# Patient Record
Sex: FEMALE | Race: WHITE | NOT HISPANIC OR LATINO | Employment: FULL TIME | URBAN - METROPOLITAN AREA
[De-identification: names, ages, dates, MRNs, and addresses within clinical notes are randomized per-mention and may not be internally consistent; named-entity substitution may affect disease eponyms.]

---

## 2022-01-17 ENCOUNTER — HOSPITAL ENCOUNTER (EMERGENCY)
Facility: CLINIC | Age: 37
Discharge: HOME | End: 2022-01-17
Attending: EMERGENCY MEDICINE | Admitting: EMERGENCY MEDICINE
Payer: COMMERCIAL

## 2022-01-17 VITALS
RESPIRATION RATE: 118 BRPM | TEMPERATURE: 98.1 F | WEIGHT: 293 LBS | OXYGEN SATURATION: 98 % | HEIGHT: 64 IN | HEART RATE: 106 BPM | DIASTOLIC BLOOD PRESSURE: 107 MMHG | BODY MASS INDEX: 50.02 KG/M2 | SYSTOLIC BLOOD PRESSURE: 148 MMHG

## 2022-01-17 DIAGNOSIS — G89.29 CHRONIC PAIN OF LEFT LOWER EXTREMITY: ICD-10-CM

## 2022-01-17 DIAGNOSIS — F10.920 ALCOHOL INTOXICATION, UNCOMPLICATED (H): ICD-10-CM

## 2022-01-17 DIAGNOSIS — M79.605 CHRONIC PAIN OF LEFT LOWER EXTREMITY: ICD-10-CM

## 2022-01-17 PROCEDURE — 250N000011 HC RX IP 250 OP 636: Performed by: EMERGENCY MEDICINE

## 2022-01-17 PROCEDURE — 96372 THER/PROPH/DIAG INJ SC/IM: CPT | Performed by: EMERGENCY MEDICINE

## 2022-01-17 PROCEDURE — 99284 EMERGENCY DEPT VISIT MOD MDM: CPT

## 2022-01-17 RX ORDER — KETOROLAC TROMETHAMINE 30 MG/ML
30 INJECTION, SOLUTION INTRAMUSCULAR; INTRAVENOUS ONCE
Status: COMPLETED | OUTPATIENT
Start: 2022-01-17 | End: 2022-01-17

## 2022-01-17 RX ORDER — GABAPENTIN 300 MG/1
300 CAPSULE ORAL
Status: DISCONTINUED | OUTPATIENT
Start: 2022-01-17 | End: 2022-01-17 | Stop reason: HOSPADM

## 2022-01-17 RX ORDER — GABAPENTIN 300 MG/1
300 CAPSULE ORAL ONCE
Status: DISCONTINUED | OUTPATIENT
Start: 2022-01-17 | End: 2022-01-17

## 2022-01-17 RX ADMIN — KETOROLAC TROMETHAMINE 30 MG: 30 INJECTION, SOLUTION INTRAMUSCULAR at 01:52

## 2022-01-17 ASSESSMENT — MIFFLIN-ST. JEOR: SCORE: 2126.51

## 2022-01-17 NOTE — ED TRIAGE NOTES
Pt was with her ex boyfriend tonight who got pulled over for a DUI, Pt got out of the vehicle slipped and felt that she was to intoxicated to go home and take care of herself.

## 2022-01-17 NOTE — DISCHARGE INSTRUCTIONS
Abstain from alcohol  Follow-up with your primary care doctor within the next week for recheck  Return to the emergency department for worsening problems or concerns

## 2022-01-17 NOTE — ED PROVIDER NOTES
EMERGENCY DEPARTMENT ENCOUnter      NAME: Marianela Nickerson  AGE: 36 year old female  YOB: 1985  MRN: [id number]  EVALUATION DATE & TIME: 1/17/2022 12:29 AM    PCP: No primary care provider on file.    ED PROVIDER: Mitra Coe MD      Chief Complaint   Patient presents with     Alcohol Intoxication         FINAL IMPRESSION:  1. Alcohol intoxication, uncomplicated (H)    2. Chronic pain of left lower extremity          ED COURSE & MEDICAL DECISION MAKING:      In summary, the patient is a 36-year-old female brought to the emergency department by EMS for evaluation of alcohol intoxication and concern that she may not be able to care for herself since her  was arrested for DWI.  Patient's mother is able to stay with her through the night and she has a service dog.  She would like to go home without any further evaluation which I think is reasonable.  Patient declines any testing in the emergency department  12:26AM I met with the patient for the initial interview and physical examination. Discussed plan for treatment and workup in the ED. PPE: Provider wore surgical cap, goggles, and N95 mask.   1:12 AM Patient's mother called. I spoke with her.  Toradol 30 mg IM was administered for her left foot pain  1:14 AM I updated the patient. Patient always makes comments about suicidal ideation due to chronic pain and has a therapist that she talks to. She contracts for safety and will not harm herself tonight, and will return to the emergency department if she is feeling unsafe    At the conclusion of the encounter I discussed the results of all of the tests and the disposition. The questions were answered. The patient or family acknowledged understanding and was agreeable with the care plan.         MEDICATIONS GIVEN IN THE EMERGENCY:  Medications   ketorolac (TORADOL) injection 30 mg (30 mg Intramuscular Given 1/17/22 0152)       NEW PRESCRIPTIONS STARTED AT TODAY'S ER VISIT  There are no  discharge medications for this patient.         =================================================================    HPI    Marianela Nickerson is a 36 year old female with a pertinent history of chronic left foot pain s/p multiple surgeries who presents to this ED via EMS for evaluation of intoxication.     Patient is unable to walk secondary to chronic foot pain and her  is her caretaker. Tonight, her  was arrested for DUI, they went to the house for a welfare check, and did not feel that patient could care for her self because she was intoxicated and unable to walk, so they brought her to the ED. Patient has no current complaints aside from chronic left foot pain. States that she had 3 drinks tonight. Has a history of anxiety. Fully vaccinated for Covid-19 + booster.       REVIEW OF SYSTEMS     Constitutional:  Denies fever or chills  HENT:  Denies sore throat   Respiratory:  Denies cough or shortness of breath   Cardiovascular:  Denies chest pain or palpitations  GI:  Denies abdominal pain, nausea, or vomiting  Musculoskeletal:  Denies any new extremity pain. Reports chronic left foot pain.  Skin:  Denies rash   Neurologic:  Denies headache, focal weakness or sensory changes    All other systems reviewed and are negative      PAST MEDICAL HISTORY:  History reviewed. No pertinent past medical history.    PAST SURGICAL HISTORY:  History reviewed. No pertinent surgical history.        CURRENT MEDICATIONS:    No current outpatient medications on file.      ALLERGIES:  No Known Allergies    FAMILY HISTORY:  History reviewed. No pertinent family history.    SOCIAL HISTORY:   Social History     Socioeconomic History     Marital status: Single     Spouse name: None     Number of children: None     Years of education: None     Highest education level: None   Occupational History     None   Tobacco Use     Smoking status: None     Smokeless tobacco: None   Substance and Sexual Activity     Alcohol use: Yes      Drug use: Yes     Types: Marijuana     Sexual activity: None   Other Topics Concern     None   Social History Narrative    1/17/22: Lives at home with her , who is her caretaker. Patient has trouble walking due to chronic foot pain, has a service dog at home. Smokes marijuana to help with the pain.      Social Determinants of Health     Financial Resource Strain: Not on file   Food Insecurity: Not on file   Transportation Needs: Not on file   Physical Activity: Not on file   Stress: Not on file   Social Connections: Not on file   Intimate Partner Violence: Not on file   Housing Stability: Not on file       PHYSICAL EXAM    Constitutional: Obese  HENT:  Normocephalic, Atraumatic, Bilateral external ears normal, Oropharynx moist, Nose normal.   Neck:  Normal range of motion, No meningismus, No stridor.   Eyes:  EOMI, Conjunctiva normal, No discharge.   Respiratory:  Normal breath sounds, No respiratory distress, No wheezing, No chest tenderness.   Cardiovascular:  Normal heart rate, Normal rhythm, No murmurs  GI:  Soft, No tenderness, No guarding, No CVA tenderness.   Musculoskeletal:  Neurovascularly intact distally, No edema, No tenderness, No cyanosis, Good range of motion in all major joints. No tenderness to palpation or major deformities noted.   Integument:  Warm, Dry, No erythema, No rash.   Lymphatic:  No lymphadenopathy noted.   Neurologic:  Alert & oriented x 3, Normal motor function, Normal sensory function, No focal deficits noted.   Psychiatric:  Affect normal, Judgment normal, Mood normal.                I, Nelly Patel, am serving as a scribe to document services personally performed by Dr. Coe based on my observation and the provider's statements to me. I, Mitra Coe MD attest that Nelly Patel is acting in a scribe capacity, has observed my performance of the services and has documented them in accordance with my direction.    Mitra Coe MD  Emergency  [facility] EMERGENCY ROOM  [address] [facility] [address]  [phone]  Dept: [phone]     Mitra Coe MD  01/18/22 2222

## 2022-05-26 ENCOUNTER — TRANSFERRED RECORDS (OUTPATIENT)
Dept: HEALTH INFORMATION MANAGEMENT | Facility: CLINIC | Age: 37
End: 2022-05-26

## 2022-05-28 ENCOUNTER — APPOINTMENT (OUTPATIENT)
Dept: RADIOLOGY | Facility: CLINIC | Age: 37
End: 2022-05-28
Attending: EMERGENCY MEDICINE
Payer: COMMERCIAL

## 2022-05-28 ENCOUNTER — HOSPITAL ENCOUNTER (EMERGENCY)
Facility: CLINIC | Age: 37
Discharge: HOME | End: 2022-05-28
Attending: EMERGENCY MEDICINE | Admitting: EMERGENCY MEDICINE
Payer: COMMERCIAL

## 2022-05-28 VITALS
HEART RATE: 109 BPM | RESPIRATION RATE: 18 BRPM | BODY MASS INDEX: 47.48 KG/M2 | WEIGHT: 285 LBS | HEIGHT: 65 IN | SYSTOLIC BLOOD PRESSURE: 143 MMHG | OXYGEN SATURATION: 97 % | DIASTOLIC BLOOD PRESSURE: 91 MMHG | TEMPERATURE: 98.5 F

## 2022-05-28 DIAGNOSIS — S90.30XA CONTUSION OF DORSUM OF FOOT: ICD-10-CM

## 2022-05-28 PROCEDURE — 250N000011 HC RX IP 250 OP 636: Performed by: EMERGENCY MEDICINE

## 2022-05-28 PROCEDURE — 96372 THER/PROPH/DIAG INJ SC/IM: CPT | Performed by: EMERGENCY MEDICINE

## 2022-05-28 PROCEDURE — 99285 EMERGENCY DEPT VISIT HI MDM: CPT | Mod: 25

## 2022-05-28 PROCEDURE — 73630 X-RAY EXAM OF FOOT: CPT | Mod: LT

## 2022-05-28 RX ORDER — HYDROMORPHONE HYDROCHLORIDE 1 MG/ML
0.5 INJECTION, SOLUTION INTRAMUSCULAR; INTRAVENOUS; SUBCUTANEOUS ONCE
Status: COMPLETED | OUTPATIENT
Start: 2022-05-28 | End: 2022-05-28

## 2022-05-28 RX ORDER — TRAMADOL HYDROCHLORIDE 50 MG/1
100 TABLET ORAL ONCE
Status: DISCONTINUED | OUTPATIENT
Start: 2022-05-28 | End: 2022-05-28 | Stop reason: HOSPADM

## 2022-05-28 RX ORDER — TRAMADOL HYDROCHLORIDE 50 MG/1
50 TABLET ORAL EVERY 6 HOURS PRN
Qty: 10 TABLET | Refills: 0 | Status: SHIPPED | OUTPATIENT
Start: 2022-05-28 | End: 2022-05-31

## 2022-05-28 RX ORDER — KETOROLAC TROMETHAMINE 15 MG/ML
30 INJECTION, SOLUTION INTRAMUSCULAR; INTRAVENOUS ONCE
Status: COMPLETED | OUTPATIENT
Start: 2022-05-28 | End: 2022-05-28

## 2022-05-28 RX ADMIN — HYDROMORPHONE HYDROCHLORIDE 0.5 MG: 1 INJECTION, SOLUTION INTRAMUSCULAR; INTRAVENOUS; SUBCUTANEOUS at 03:01

## 2022-05-28 RX ADMIN — KETOROLAC TROMETHAMINE 30 MG: 15 INJECTION, SOLUTION INTRAMUSCULAR; INTRAVENOUS at 02:09

## 2022-05-28 NOTE — Clinical Note
Marianela Nickerson was seen and treated in our emergency department on 5/28/2022.  She may return to work on 06/04/2022.       If you have any questions or concerns, please don't hesitate to call.      Mp Samuel MD

## 2022-05-28 NOTE — ED TRIAGE NOTES
1 hr PTA pt states that she was out with friends and someone stepped on her foot. Swelling present. Pain 10/10. Pt states she has had many foot operations on same foot that is injured.      Triage Assessment     Row Name 05/28/22 0043       Triage Assessment (Adult)    Airway WDL WDL       Respiratory WDL    Respiratory WDL WDL       Skin Circulation/Temperature WDL    Skin Circulation/Temperature WDL WDL       Cardiac WDL    Cardiac WDL WDL       Peripheral/Neurovascular WDL    Peripheral Neurovascular WDL WDL       Cognitive/Neuro/Behavioral WDL    Cognitive/Neuro/Behavioral WDL WDL

## 2022-05-28 NOTE — ED PROVIDER NOTES
NAME: Marianela Nickerson  AGE: 36 year old female  YOB: 1985  MRN: [id number]  EVALUATION DATE & TIME: 2022  1:44 AM    PCP: Yolanda Resendiz    ED PROVIDER: Mp Samuel M.D.      Chief Complaint   Patient presents with     Foot Pain         FINAL IMPRESSION:  1. Contusion of dorsum of foot        MEDICAL DECISION MAKIN:48 AM I met with the patient, obtained history, performed an initial exam, and discussed options and plan for diagnostics and treatment here in the ED.   2:43 AM Reassessed patient. Per boyfriend, patient does not typically take pain medications due to them needing to be approved. In the previous surgeries patient was given tramadol. Boyfriend reports patient is usually calm, but with drinking and waiting tonight patient is agitated.   3:02 AM Updated patient on her imaging result. I discussed the plan for discharge with the patient, and patient is agreeable. We discussed supportive cares at home and reasons for return to the ER including new or worsening symptoms - all questions and concerns addressed. Patient to be discharged by RN.      Patient was clinically assessed and consented to treatment. After assessment, medical decision making and workup were discussed with the patient. The patient was agreeable to plan for testing, workup, and treatment.  Pertinent Labs & Imaging studies reviewed. (See chart for details)         Marianela Nickerson is a 36 year old female who presents with left foot injury.   Differential diagnosis includes but not limited to foot contusion, metatarsal fracture, navicular fracture, dislocation.  Patient with foot injury and does have chronic pain in left foot and multiple surgeries in the past.  Patient reports initially only taking Toradol for pain as she cannot take NSAIDs or oral medication due to absorption issues and stomach irritation following gastric bypass.  Shot of Toradol was given patient was sent for x-ray.  Patient did  "complain that Toradol did not help with her pain however awaiting x-ray results.  X-ray performed and did not show any acute bony abnormalities.  Patient likely with just contusions to the foot however given her chronic pain is exacerbated.  Patient does report now that she has taken Toradol in the past after surgeries but does not take any other narcotics.  I did give her 2 tablets of tramadol and will give her prescription for home as she requested something stronger given her inability to take anything very strong orally.  Patient will be put on crutches and nonweightbearing with recommendations to ice, elevate and rest the foot with nonweightbearing and then she was to apply compression.  At this time given the sensitivity and tenderness I recommended ice and elevation prior to compression since she was so acutely tender over the bruised area.    0 minutes of critical care time    MEDICATIONS GIVEN IN THE EMERGENCY:  Medications   ketorolac (TORADOL) injection 30 mg (30 mg Intramuscular Given 5/28/22 0209)   HYDROmorphone (PF) (DILAUDID) injection 0.5 mg (0.5 mg Intramuscular Given 5/28/22 0301)       NEW PRESCRIPTIONS STARTED AT TODAY'S ER VISIT:  There are no discharge medications for this patient.         =================================================================    HPI    Patient information was obtained from: Patient     Use of : N/A      Marianela Nickerson is a 36 year old female with a past medical history of RSD of left ankle, s/p gastrectomy, who presents with left foot pain. Patient reports she was stepped on her left foot by someone's high heel while out with friends. She notes she was wearing flats when this occurred. Now area is very tender to palpation. Patient reports history of CRPS and extensive left foot surgeries which includes having 36 pins and 27 plates. She reports allergies to oral medications to due having her \"esophagus and stomach removed\". Patient requests for tramadol " "as that has helpd her pain in the past. Patient denies any additional complaints at this time.       REVIEW OF SYSTEMS   Review of Systems   Musculoskeletal:        Positive for left foot pain and swelling.   All other systems reviewed and are negative.       PAST MEDICAL HISTORY:  No past medical history on file.    PAST SURGICAL HISTORY:  No past surgical history on file.    CURRENT MEDICATIONS:    No current facility-administered medications for this encounter.    Current Outpatient Medications:      traMADol (ULTRAM) 50 MG tablet, Take 1 tablet (50 mg) by mouth every 6 hours as needed for breakthrough pain or severe pain, Disp: 10 tablet, Rfl: 0    ALLERGIES:  Allergies   Allergen Reactions     Penicillins Hives, Unknown and Nausea and Vomiting     Ketamine Other (See Comments) and Unknown     Hallucinations       Nsaids Other (See Comments) and Unknown     Avoids due to surgery for achalasia...    gastrectomy       FAMILY HISTORY:  No family history on file.    SOCIAL HISTORY:   Social History     Socioeconomic History     Marital status: Single   Substance and Sexual Activity     Alcohol use: Yes     Drug use: Yes     Types: Marijuana   Social History Narrative    1/17/22: Lives at home with her , who is her caretaker. Patient has trouble walking due to chronic foot pain, has a service dog at home. Smokes marijuana to help with the pain.        PHYSICAL EXAM:    Vitals: BP (!) 143/91 (BP Location: Left arm, Patient Position: Chair, Cuff Size: Adult Regular)   Pulse 109   Temp 98.5  F (36.9  C) (Oral)   Resp 18   Ht 1.651 m (5' 5\")   Wt 129.3 kg (285 lb)   SpO2 97%   BMI 47.43 kg/m     Physical Exam  Vitals and nursing note reviewed.   Constitutional:       General: She is not in acute distress.     Appearance: Normal appearance. She is normal weight. She is not ill-appearing or toxic-appearing.   HENT:      Head: Atraumatic.   Cardiovascular:      Pulses: Normal pulses.   Pulmonary:      Effort: " No respiratory distress.   Musculoskeletal:         General: Swelling, tenderness and signs of injury present. No deformity.      Cervical back: Normal range of motion.        Feet:    Skin:     General: Skin is warm and dry.      Capillary Refill: Capillary refill takes less than 2 seconds.      Coloration: Skin is not pale.   Neurological:      General: No focal deficit present.      Mental Status: She is alert.   Psychiatric:         Mood and Affect: Affect is angry.         Behavior: Behavior is cooperative.             RADIOLOGY:  XR Foot Left 3 Views   Final Result   IMPRESSION: No acute fracture or dislocation.            I, Sawyer Tyler, am serving as a scribe to document services personally performed by Dr. Mp Samuel  based on my observation and the provider's statements to me. I, Mp Samuel MD attest that Sawyer Tyler is acting in a scribe capacity, has observed my performance of the services and has documented them in accordance with my direction.      Mp Samuel M.D.  Emergency Medicine  [facility] Emergency Department     Mp Samuel MD  05/28/22 8039

## 2022-05-28 NOTE — ED NOTES
"Pt requesting to take at home anxiety medication. MD updated and OK'd pt to take. Updated pt and pt stated, \"I do not have it I guess, it is at home\". Pt did not take any medication at this time. ...Mackenzie Murdock RN    "

## 2022-08-15 ENCOUNTER — APPOINTMENT (OUTPATIENT)
Dept: CT IMAGING | Facility: CLINIC | Age: 37
End: 2022-08-15
Attending: EMERGENCY MEDICINE
Payer: COMMERCIAL

## 2022-08-15 ENCOUNTER — HOSPITAL ENCOUNTER (EMERGENCY)
Facility: CLINIC | Age: 37
Discharge: HOME | End: 2022-08-15
Attending: EMERGENCY MEDICINE | Admitting: EMERGENCY MEDICINE
Payer: COMMERCIAL

## 2022-08-15 VITALS
HEART RATE: 66 BPM | HEIGHT: 65 IN | WEIGHT: 280 LBS | BODY MASS INDEX: 46.65 KG/M2 | OXYGEN SATURATION: 99 % | DIASTOLIC BLOOD PRESSURE: 79 MMHG | RESPIRATION RATE: 16 BRPM | SYSTOLIC BLOOD PRESSURE: 153 MMHG | TEMPERATURE: 98.3 F

## 2022-08-15 DIAGNOSIS — R51.9 ACUTE NONINTRACTABLE HEADACHE, UNSPECIFIED HEADACHE TYPE: ICD-10-CM

## 2022-08-15 LAB
HOLD SPECIMEN: NORMAL

## 2022-08-15 PROCEDURE — 250N000013 HC RX MED GY IP 250 OP 250 PS 637: Performed by: EMERGENCY MEDICINE

## 2022-08-15 PROCEDURE — 96374 THER/PROPH/DIAG INJ IV PUSH: CPT

## 2022-08-15 PROCEDURE — 96375 TX/PRO/DX INJ NEW DRUG ADDON: CPT

## 2022-08-15 PROCEDURE — 99285 EMERGENCY DEPT VISIT HI MDM: CPT | Mod: 25

## 2022-08-15 PROCEDURE — 96372 THER/PROPH/DIAG INJ SC/IM: CPT | Performed by: EMERGENCY MEDICINE

## 2022-08-15 PROCEDURE — 96361 HYDRATE IV INFUSION ADD-ON: CPT

## 2022-08-15 PROCEDURE — 258N000003 HC RX IP 258 OP 636: Performed by: EMERGENCY MEDICINE

## 2022-08-15 PROCEDURE — 250N000011 HC RX IP 250 OP 636: Performed by: EMERGENCY MEDICINE

## 2022-08-15 PROCEDURE — 70450 CT HEAD/BRAIN W/O DYE: CPT

## 2022-08-15 RX ORDER — DIPHENHYDRAMINE HYDROCHLORIDE 50 MG/ML
50 INJECTION INTRAMUSCULAR; INTRAVENOUS ONCE
Status: COMPLETED | OUTPATIENT
Start: 2022-08-15 | End: 2022-08-15

## 2022-08-15 RX ORDER — KETOROLAC TROMETHAMINE 30 MG/ML
30 INJECTION, SOLUTION INTRAMUSCULAR; INTRAVENOUS ONCE
Status: COMPLETED | OUTPATIENT
Start: 2022-08-15 | End: 2022-08-15

## 2022-08-15 RX ORDER — OXYCODONE AND ACETAMINOPHEN 5; 325 MG/1; MG/1
1 TABLET ORAL ONCE
Status: COMPLETED | OUTPATIENT
Start: 2022-08-15 | End: 2022-08-15

## 2022-08-15 RX ORDER — KETOROLAC TROMETHAMINE 15 MG/ML
15 INJECTION, SOLUTION INTRAMUSCULAR; INTRAVENOUS ONCE
Status: DISCONTINUED | OUTPATIENT
Start: 2022-08-15 | End: 2022-08-15

## 2022-08-15 RX ORDER — CYCLOBENZAPRINE HCL 10 MG
10 TABLET ORAL ONCE
Status: COMPLETED | OUTPATIENT
Start: 2022-08-15 | End: 2022-08-15

## 2022-08-15 RX ORDER — DIPHENHYDRAMINE HYDROCHLORIDE 50 MG/ML
50 INJECTION INTRAMUSCULAR; INTRAVENOUS ONCE
Status: DISCONTINUED | OUTPATIENT
Start: 2022-08-15 | End: 2022-08-15

## 2022-08-15 RX ADMIN — SODIUM CHLORIDE 1000 ML: 9 INJECTION, SOLUTION INTRAVENOUS at 07:47

## 2022-08-15 RX ADMIN — OXYCODONE AND ACETAMINOPHEN 1 TABLET: 5; 325 TABLET ORAL at 06:45

## 2022-08-15 RX ADMIN — KETOROLAC TROMETHAMINE 30 MG: 30 INJECTION, SOLUTION INTRAMUSCULAR; INTRAVENOUS at 06:46

## 2022-08-15 RX ADMIN — CYCLOBENZAPRINE 10 MG: 10 TABLET, FILM COATED ORAL at 06:36

## 2022-08-15 RX ADMIN — DIPHENHYDRAMINE HYDROCHLORIDE 50 MG: 50 INJECTION, SOLUTION INTRAMUSCULAR; INTRAVENOUS at 07:44

## 2022-08-15 RX ADMIN — PROCHLORPERAZINE EDISYLATE 5 MG: 5 INJECTION INTRAMUSCULAR; INTRAVENOUS at 07:44

## 2022-08-15 ASSESSMENT — ACTIVITIES OF DAILY LIVING (ADL)
ADLS_ACUITY_SCORE: 35
ADLS_ACUITY_SCORE: 35

## 2022-08-15 NOTE — Clinical Note
Marianlea Nickerson was seen and treated in our emergency department on 8/15/2022.  She may return to work on 08/16/2022.       If you have any questions or concerns, please don't hesitate to call.      Neftali Jefferson MD

## 2022-08-15 NOTE — DISCHARGE INSTRUCTIONS
You were seen in the emergency department at [facility] for headache.  Your evaluation included a head CT which looked stable and reassuring.  You were treated with multiple IV medications which we think will continue to help improve your headache symptoms throughout the day.  Please make sure you are drinking plenty of liquids to stay well-hydrated.  You can continue using Imitrex and Tylenol 650 mg every 6 hours for pain.  You can also take Benadryl at nighttime to help with sleep and headache.  We would like you to follow-up in clinic to review any ongoing concerns before the end of the week if your headaches do not completely resolved.

## 2022-08-15 NOTE — ED PROVIDER NOTES
EMERGENCY DEPARTMENT ENCOUNTER      NAME: Marianela Nickerson  AGE: 36 year old female  YOB: 1985  MRN: [id number]  EVALUATION DATE & TIME: 8/15/2022  5:47 AM    PCP: Yolanda Resendiz    ED PROVIDER: Neftali Jefferson M.D.      Chief Complaint   Patient presents with     Headache       FINAL IMPRESSION:  1. Acute nonintractable headache, unspecified headache type        ED COURSE & MEDICAL DECISION MAKIN:12 AM I met with the patient, obtained history, performed an initial exam, and discussed options and plan for diagnostics and treatment here in the ED.  7:13 AM My medical student checked on the patient.   8:44 AM My medical student updated the patient on result.   8:52 AM I rechecked on the patient. We discussed the plan for discharge and the patient is agreeable. Reviewed supportive cares, symptomatic treatment, outpatient follow up, and reasons to return to the Emergency Department. Patient to be discharged by ED RN.     36 year old female presents to the Emergency Department for evaluation of headache.  Patient having intermittent sudden onset headaches for the last 3 days, today was more severe.  Patient is vitally stable and neurologically intact when she arrives to the emergency department.  She appears quite anxious and uncomfortable however.  She ultimately received multiple agents to control her headache eventually having obtain IV access and administering Compazine and Benadryl seem to provide her with the most relief.  Given multiple agents needed, did elect to obtain noncontrast head CT which was negative for anything like subarachnoid hemorrhage or mass lesion.  Not suspect anything else like arterial dissection or sinus thrombosis requiring additional imaging at this time.  Patient was ultimately resting more comfortably on reevaluation.  She is agreeable to continued supportive measures at home and primary care follow-up.  Return precautions were reviewed.    At the conclusion of  the encounter I discussed the results of all of the tests and the disposition. The questions were answered. The patient or family acknowledged understanding and was agreeable with the care plan.     MEDICATIONS GIVEN IN THE EMERGENCY:  Medications   cyclobenzaprine (FLEXERIL) tablet 10 mg (10 mg Oral Given 8/15/22 0636)   ketorolac (TORADOL) injection 30 mg (30 mg Intramuscular Given 8/15/22 0646)   oxyCODONE-acetaminophen (PERCOCET) 5-325 MG per tablet 1 tablet (1 tablet Oral Given 8/15/22 0645)   0.9% sodium chloride BOLUS (0 mLs Intravenous Stopped 8/15/22 0918)   diphenhydrAMINE (BENADRYL) injection 50 mg (50 mg Intravenous Given 8/15/22 0744)   prochlorperazine (COMPAZINE) injection 5 mg (5 mg Intravenous Given 8/15/22 0744)       NEW PRESCRIPTIONS STARTED AT TODAY'S ER VISIT  There are no discharge medications for this patient.         =================================================================    HPI    Patient information was obtained from: Patient    Use of : N/A    Marianela Nickerson is a 36 year old female with a pertinent history of HTN, migraine, RSD, gastric bypass, IUD, obesity, and SI who presents to this ED via walk-in for evaluation of headache.    Patient reports a sudden headache that happened around 0500 after turning her neck to the right, which woke her up with pain. She states the pain radiates down to her neck and has taken OTC medicines as well as Imitrex and propanolol with some relief. Patient was feeling better as the headache went away for a while. She took more medicine as the headache came back, but with no relief this time. Patient also reports eye pain but not worse when moving her eyes. She has an IUD and denies chance of pregnancy. Patient is hysterically crying during the doctor's exam. Otherwise, patient denies vomiting and nausea. No other complaints at this time.    REVIEW OF SYSTEMS   All systems reviewed and negative except as noted in HPI.  Positive for  "headache that radiates to the neck.  Positive for eye pain.  Negative for vomiting and nausea.     PAST MEDICAL HISTORY:  No past medical history on file.    PAST SURGICAL HISTORY:  No past surgical history on file.        CURRENT MEDICATIONS:    No current facility-administered medications for this encounter.     No current outpatient medications on file.         ALLERGIES:  Allergies   Allergen Reactions     Penicillins Hives, Unknown and Nausea and Vomiting     Ketamine Other (See Comments) and Unknown     Hallucinations       Nsaids Other (See Comments) and Unknown     Avoids due to surgery for achalasia...    gastrectomy       FAMILY HISTORY:  No family history on file.    SOCIAL HISTORY:   Social History     Socioeconomic History     Marital status: Single   Substance and Sexual Activity     Alcohol use: Yes     Drug use: Yes     Types: Marijuana   Social History Narrative    1/17/22: Lives at home with her , who is her caretaker. Patient has trouble walking due to chronic foot pain, has a service dog at home. Smokes marijuana to help with the pain.        VITALS:  BP (!) 153/79 (BP Location: Left arm)   Pulse 66   Temp 98.3  F (36.8  C) (Oral)   Resp 16   Ht 1.651 m (5' 5\")   Wt 127 kg (280 lb)   SpO2 99%   BMI 46.59 kg/m      PHYSICAL EXAM    Constitutional: Well developed, Well nourished, somewhat anxious and uncomfortable, but nontoxic appearing  HENT: Normocephalic, Atraumatic. Neck Supple. No meningismus  Eyes: EOMI, Conjunctiva normal.  Respiratory: Breathing comfortably on room air. Speaks full sentences easily. Lungs clear to ascultation.  Cardiovascular: Normal heart rate, Regular rhythm. No peripheral edema.  Abdomen: Soft  Musculoskeletal: Good range of motion in all major joints. No major deformities noted.  Integument: Warm, Dry.  Neurologic: Fully awake, alert, oriented.  Face is symmetric.  Speech is normal.  Cranial nerves II through XII intact.  Strength is 5 out of 5 throughout " bilateral upper and lower extremities.  Sensation to light touch intact x4.  Patient is ambulatory.  Psychiatric: Cooperative. Affect appropriate.       RADIOLOGY:  Reviewed all pertinent imaging. Please see official radiology report.  Head CT w/o contrast   Final Result   IMPRESSION:   1.  No acute intracranial process.          I, Shanon Trotter, am serving as a scribe to document services personally performed by Dr. Neftali Jefferson, based on my observation and the provider's statements to me. I, Neftali Jefferson MD attest that Shanon Trotter is acting in a scribe capacity, has observed my performance of the services and has documented them in accordance with my direction.    Neftali Jefferson M.D.  Emergency Medicine  [facility] EMERGENCY ROOM  [address] [address] [phone]  Dept: [phone]     Neftali Jefferson MD  08/15/22 2072

## 2022-08-15 NOTE — ED TRIAGE NOTES
Patient arrives with complaints of right sided headache since Friday radiates down neck, has taken OTC meds as well as imitrex and propanolol with no relief; pain worsened at approx 5 am; patient is hysterically crying during triage;      Triage Assessment     Row Name 08/15/22 0563       Triage Assessment (Adult)    Airway WDL WDL       Respiratory WDL    Respiratory WDL WDL       Skin Circulation/Temperature WDL    Skin Circulation/Temperature WDL WDL       Cardiac WDL    Cardiac WDL WDL       Peripheral/Neurovascular WDL    Peripheral Neurovascular WDL WDL

## 2023-08-03 ENCOUNTER — HOSPITAL ENCOUNTER (OUTPATIENT)
Dept: MRI IMAGING | Facility: HOSPITAL | Age: 38
Discharge: HOME | End: 2023-08-03
Attending: FAMILY MEDICINE
Payer: COMMERCIAL

## 2023-08-03 DIAGNOSIS — G90.50: ICD-10-CM

## 2023-08-03 DIAGNOSIS — M17.9 KNEE OSTEOARTHRITIS: ICD-10-CM

## 2023-08-03 DIAGNOSIS — M19.90 ARTHRITIS: ICD-10-CM

## 2023-08-03 PROCEDURE — 73721 MRI JNT OF LWR EXTRE W/O DYE: CPT | Mod: LT,XS

## 2023-08-03 PROCEDURE — 73721 MRI JNT OF LWR EXTRE W/O DYE: CPT | Mod: LT

## 2023-12-16 ENCOUNTER — APPOINTMENT (OUTPATIENT)
Dept: RADIOLOGY | Facility: CLINIC | Age: 38
End: 2023-12-16
Attending: EMERGENCY MEDICINE
Payer: COMMERCIAL

## 2023-12-16 ENCOUNTER — HOSPITAL ENCOUNTER (EMERGENCY)
Facility: CLINIC | Age: 38
Discharge: HOME | End: 2023-12-16
Attending: EMERGENCY MEDICINE | Admitting: EMERGENCY MEDICINE
Payer: COMMERCIAL

## 2023-12-16 ENCOUNTER — APPOINTMENT (OUTPATIENT)
Dept: CT IMAGING | Facility: CLINIC | Age: 38
End: 2023-12-16
Attending: EMERGENCY MEDICINE
Payer: COMMERCIAL

## 2023-12-16 VITALS
DIASTOLIC BLOOD PRESSURE: 87 MMHG | BODY MASS INDEX: 42.77 KG/M2 | OXYGEN SATURATION: 96 % | SYSTOLIC BLOOD PRESSURE: 132 MMHG | TEMPERATURE: 98 F | RESPIRATION RATE: 14 BRPM | WEIGHT: 257 LBS | HEART RATE: 107 BPM
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
## 2023-12-16 DIAGNOSIS — F10.920 ALCOHOLIC INTOXICATION WITHOUT COMPLICATION (H): ICD-10-CM

## 2023-12-16 DIAGNOSIS — S09.90XA HEAD INJURY, INITIAL ENCOUNTER: ICD-10-CM

## 2023-12-16 DIAGNOSIS — S40.022A ARM CONTUSION, LEFT, INITIAL ENCOUNTER: ICD-10-CM

## 2023-12-16 PROCEDURE — 99285 EMERGENCY DEPT VISIT HI MDM: CPT | Mod: 25

## 2023-12-16 PROCEDURE — 73090 X-RAY EXAM OF FOREARM: CPT | Mod: LT

## 2023-12-16 PROCEDURE — 96372 THER/PROPH/DIAG INJ SC/IM: CPT | Performed by: EMERGENCY MEDICINE

## 2023-12-16 PROCEDURE — 70450 CT HEAD/BRAIN W/O DYE: CPT

## 2023-12-16 PROCEDURE — 250N000011 HC RX IP 250 OP 636: Performed by: EMERGENCY MEDICINE

## 2023-12-16 RX ORDER — ACETAMINOPHEN 325 MG/1
650 TABLET ORAL ONCE
Status: DISCONTINUED | OUTPATIENT
Start: 2023-12-16 | End: 2023-12-16

## 2023-12-16 RX ORDER — ONDANSETRON 4 MG/1
4 TABLET, ORALLY DISINTEGRATING ORAL ONCE
Status: COMPLETED | OUTPATIENT
Start: 2023-12-16 | End: 2023-12-16

## 2023-12-16 RX ADMIN — ONDANSETRON 4 MG: 4 TABLET, ORALLY DISINTEGRATING ORAL at 02:55

## 2023-12-16 RX ADMIN — HYDROMORPHONE HYDROCHLORIDE 1 MG: 1 INJECTION, SOLUTION INTRAMUSCULAR; INTRAVENOUS; SUBCUTANEOUS at 01:57

## 2023-12-16 ASSESSMENT — ACTIVITIES OF DAILY LIVING (ADL)
ADLS_ACUITY_SCORE: 35
ADLS_ACUITY_SCORE: 35

## 2023-12-16 NOTE — ED PROVIDER NOTES
EMERGENCY DEPARTMENT ENCOUNTER      NAME: Marianela Nickerson  AGE: 38 year old female  YOB: 1985  MRN: [id number]  EVALUATION DATE & TIME: 12/16/2023 12:56 AM    PCP: Yolanda Resendiz    ED PROVIDER: Cristobal Bill M.D.      Chief Complaint   Patient presents with    Alcohol Intoxication     f    Fall         FINAL IMPRESSION:  1. Fall, initial encounter    2. Alcoholic intoxication without complication (H24)    3. Head injury, initial encounter    4. Arm contusion, left, initial encounter          ED COURSE & MEDICAL DECISION MAKING:    Pertinent Labs & Imaging studies reviewed. (See chart for details)  38 year old female presents to the Emergency Department for evaluation of fall.  Patient had been drinking and tripped over her cat falling and hitting her head on the edge of the tub.  Also hit her left forearm.  Normal neurologic exam.  Head CT is done is normal.  Neck is nontender.  Does have a contusion to her left forearm.  X-ray does not show fracture.  No other injuries noted.  I do not think further workup is necessary in the ER.  Will be discharged home with significant other.  Will return for worsening symptoms    1:14 AM I met with the patient to gather history and to perform my initial exam. I discussed the plan for care while in the Emergency Department.   2:49 AM I rechecked and updated patient on results. We discussed the plan for discharge and the patient is agreeable. Reviewed supportive cares, symptomatic treatment, outpatient follow up, and reasons to return to the Emergency Department. Patient to be discharged by ED RN.     At the conclusion of the encounter I discussed the results of all of the tests and the disposition. The questions were answered. The patient or family acknowledged understanding and was agreeable with the care plan.     Medical Decision Making    History:  Supplemental history from: Documented in chart, if applicable  External Record(s) reviewed: Documented in  chart, if applicable.    Work Up:  Chart documentation includes differential considered and any EKGs or imaging independently interpreted by provider, where specified.  In additional to work up documented, I considered the following work up: Documented in chart, if applicable.    External consultation:  Discussion of management with another provider: Documented in chart, if applicable    Complicating factors:  Care impacted by chronic illness: Hypertension and Mental Health  Care affected by social determinants of health: N/A    Disposition considerations: Discharge. No recommendations on prescription strength medication(s). See documentation for any additional details.         MEDICATIONS GIVEN IN THE EMERGENCY:  Medications   HYDROmorphone (DILAUDID) injection 1 mg (1 mg Intramuscular $Given 12/16/23 0157)   ondansetron (ZOFRAN ODT) ODT tab 4 mg (4 mg Oral $Given 12/16/23 7283)       NEW PRESCRIPTIONS STARTED AT TODAY'S ER VISIT  There are no discharge medications for this patient.         =================================================================    HPI    Patient information was obtained from: Patient    Use of : N/A         Marianela Nickerson is a 38 year old female with a pertinent history of HTN, CAMMIE, depression, who presents to this ED via EMS for evaluation of alcohol intoxication and fall.    Patient reports she was drinking tonight, when she tripped over her cat, and fell into bathtub. Patient now reports pain to left forearm. She has been using ice, which has helped the pain. Patient reports previous injuries to left arm, to which she has had surgical interventions for. Otherwise, she denies any vision changes, nausea, vomiting, chest pain, and abdominal pain. Patient reports a history of anxiety, CRPS, and terminal acolasia. She is currently on Wegovy, Propranolol. She states she does not drink daily. No other complaints at this time.     PAST MEDICAL HISTORY:  History reviewed. No  pertinent past medical history.    PAST SURGICAL HISTORY:  History reviewed. No pertinent surgical history.        CURRENT MEDICATIONS:    No current facility-administered medications for this encounter.     No current outpatient medications on file.         ALLERGIES:  Allergies   Allergen Reactions    Penicillins Hives, Unknown and Nausea and Vomiting    Ketamine Other (See Comments) and Unknown     Hallucinations      Nsaids Other (See Comments) and Unknown     Avoids due to surgery for achalasia...    gastrectomy       FAMILY HISTORY:  History reviewed. No pertinent family history.    SOCIAL HISTORY:   Social History     Socioeconomic History    Marital status: Single   Substance and Sexual Activity    Alcohol use: Yes    Drug use: Yes     Types: Marijuana   Social History Narrative    1/17/22: Lives at home with her , who is her caretaker. Patient has trouble walking due to chronic foot pain, has a service dog at home. Smokes marijuana to help with the pain.        VITALS:  /87   Pulse 107   Temp 98  F (36.7  C) (Oral)   Resp 14   Wt 116.6 kg (257 lb)   SpO2 96%   BMI 42.77 kg/m      PHYSICAL EXAM    Physical Exam  Constitutional:       General: She is not in acute distress.     Appearance: She is not diaphoretic.   HENT:      Head: Atraumatic.   Eyes:      Pupils: Pupils are equal, round, and reactive to light.   Cardiovascular:      Rate and Rhythm: Regular rhythm.      Heart sounds: Normal heart sounds.   Pulmonary:      Effort: No respiratory distress.      Breath sounds: Normal breath sounds.   Chest:      Chest wall: No tenderness.   Abdominal:      General: Bowel sounds are normal.      Palpations: Abdomen is soft.      Tenderness: There is no abdominal tenderness.   Musculoskeletal:         General: Swelling and tenderness present. Normal range of motion.      Cervical back: No tenderness.      Thoracic back: No tenderness.      Lumbar back: No tenderness.      Comments: Swelling and  tenderness over the mid left forearm.  There is a bruise noted there.   Skin:     Findings: No abrasion or laceration.   Neurological:      Mental Status: She is alert and oriented to person, place, and time.      Comments: 5 out of 5 strength in bilateral upper and lower extremities.  Sensation intact in all 4 extremes.  Cranial nerves intact.  No pronator drift               LAB:  All pertinent labs reviewed and interpreted.  Labs Ordered and Resulted from Time of ED Arrival to Time of ED Departure - No data to display    RADIOLOGY:  Reviewed all pertinent imaging. Please see official radiology report.  CT Head w/o Contrast   Final Result   IMPRESSION:   1.  No acute intracranial hemorrhage or calvarial fracture.      Radius/Ulna XR,  PA &LAT, left   Final Result   IMPRESSION: No visualized acute fracture or malalignment of the left forearm.               I, Claudia Jenkins, am serving as a scribe to document services personally performed by Dr. Cristobal Bill, based on my observation and the provider's statements to me. I, Cristobal Bill MD attest that Claudia Jenkins is acting in a scribe capacity, has observed my performance of the services and has documented them in accordance with my direction.    Cristobal Bill M.D.  Emergency Medicine  [facility] EMERGENCY ROOM  [address] [facility] [address] [phone]  Dept: [phone]       Cristobal Bill MD  12/16/23 2079

## 2023-12-16 NOTE — ED TRIAGE NOTES
Pt consumed a bottle and a half of wine. Tripped over cat and fell into bathtub. Denies loc changes. Denies blood thinners. Has pain to left forearm. Hx of surgeries to left arm. Denies head, neck, nor back pain. States hit head, denies pain to head. Denies nausea.

## 2025-07-08 ENCOUNTER — HOSPITAL ENCOUNTER (EMERGENCY)
Facility: CLINIC | Age: 40
Discharge: HOME | End: 2025-07-08
Attending: EMERGENCY MEDICINE | Admitting: EMERGENCY MEDICINE
Payer: COMMERCIAL

## 2025-07-08 VITALS
OXYGEN SATURATION: 100 % | HEIGHT: 65 IN | SYSTOLIC BLOOD PRESSURE: 152 MMHG | DIASTOLIC BLOOD PRESSURE: 94 MMHG | WEIGHT: 182 LBS | HEART RATE: 79 BPM | TEMPERATURE: 97.2 F | RESPIRATION RATE: 20 BRPM | BODY MASS INDEX: 30.32 KG/M2

## 2025-07-08 DIAGNOSIS — K64.5 THROMBOSED EXTERNAL HEMORRHOIDS: ICD-10-CM

## 2025-07-08 PROCEDURE — 99284 EMERGENCY DEPT VISIT MOD MDM: CPT | Performed by: EMERGENCY MEDICINE

## 2025-07-08 PROCEDURE — 250N000013 HC RX MED GY IP 250 OP 250 PS 637: Performed by: EMERGENCY MEDICINE

## 2025-07-08 PROCEDURE — 46083 INC THROMBOSED HROID XTRNL: CPT

## 2025-07-08 PROCEDURE — 250N000009 HC RX 250: Performed by: EMERGENCY MEDICINE

## 2025-07-08 RX ORDER — HYDROCODONE BITARTRATE AND ACETAMINOPHEN 5; 325 MG/1; MG/1
1 TABLET ORAL ONCE
Refills: 0 | Status: COMPLETED | OUTPATIENT
Start: 2025-07-08 | End: 2025-07-08

## 2025-07-08 RX ORDER — DIAZEPAM 5 MG/1
5 TABLET ORAL ONCE
Status: COMPLETED | OUTPATIENT
Start: 2025-07-08 | End: 2025-07-08

## 2025-07-08 RX ORDER — LIDOCAINE AND PRILOCAINE 25; 25 MG/G; MG/G
1 CREAM TOPICAL ONCE
Status: COMPLETED | OUTPATIENT
Start: 2025-07-08 | End: 2025-07-08

## 2025-07-08 RX ORDER — HYDROCORTISONE ACETATE 25 MG/1
25 SUPPOSITORY RECTAL 2 TIMES DAILY PRN
Qty: 24 SUPPOSITORY | Refills: 0 | Status: SHIPPED | OUTPATIENT
Start: 2025-07-08

## 2025-07-08 RX ADMIN — LIDOCAINE AND PRILOCAINE 1 G: 25; 25 CREAM TOPICAL at 05:31

## 2025-07-08 RX ADMIN — HYDROCODONE BITARTRATE AND ACETAMINOPHEN 1 TABLET: 5; 325 TABLET ORAL at 04:58

## 2025-07-08 RX ADMIN — DIAZEPAM 5 MG: 5 TABLET ORAL at 04:58

## 2025-07-08 ASSESSMENT — COLUMBIA-SUICIDE SEVERITY RATING SCALE - C-SSRS
2. HAVE YOU ACTUALLY HAD ANY THOUGHTS OF KILLING YOURSELF IN THE PAST MONTH?: NO
6. HAVE YOU EVER DONE ANYTHING, STARTED TO DO ANYTHING, OR PREPARED TO DO ANYTHING TO END YOUR LIFE?: NO
1. IN THE PAST MONTH, HAVE YOU WISHED YOU WERE DEAD OR WISHED YOU COULD GO TO SLEEP AND NOT WAKE UP?: NO

## 2025-07-08 ASSESSMENT — ACTIVITIES OF DAILY LIVING (ADL)
ADLS_ACUITY_SCORE: 41
ADLS_ACUITY_SCORE: 41

## 2025-07-08 NOTE — ED PROVIDER NOTES
EMERGENCY DEPARTMENT ENCOUnter      NAME: Marianela Nickerson  AGE: 39 year old female  YOB: 1985  MRN: [id number]  EVALUATION DATE & TIME: 7/8/2025  4:35 AM    PCP: Yolanda Resendiz    ED PROVIDER: Mitra Coe MD      Chief Complaint   Patient presents with    Hemorrhoids         FINAL IMPRESSION:  1. Thrombosed external hemorrhoids          ED COURSE & MEDICAL DECISION MAKING:      In summary, the patient is a 39-year-old female that presents to the emergency department for evaluation of hemorrhoid pain thought secondary to a thrombosed hemorrhoid.  An incision and evacuation of the thrombosis was performed in the emergency department.    0440-evaluated patient.  Emla cream was applied to the hemorrhoid.  Valium 5 mg and Vicodin 1 tablet p.o. was administered for pain and anxiolysis.  0530-Incision and evacuation of thrombosed hemorrhoid was performed with minimal clot obtained    Medical Decision Making  I reviewed the EMR: Outpatient Record: previous clinic notes  Discharge. No recommendations on prescription strength medication(s). See documentation for any additional details.    MIPS (CTPE, Dental pain, Willis, Sinusitis, Asthma/COPD, Head Trauma): Not Applicable    SEPSIS: None          At the conclusion of the encounter I discussed the results of all of the tests and the disposition. The questions were answered. The patient or family acknowledged understanding and was agreeable with the care plan.         MEDICATIONS GIVEN IN THE EMERGENCY:  Medications   HYDROcodone-acetaminophen (NORCO) 5-325 MG per tablet 1 tablet (1 tablet Oral $Given 7/8/25 2722)   diazepam (VALIUM) tablet 5 mg (5 mg Oral $Given 7/8/25 5078)   lidocaine-prilocaine (EMLA) cream 1 g (1 g Topical $Given 7/8/25 4390)       NEW PRESCRIPTIONS STARTED AT TODAY'S ER VISIT  New Prescriptions    HYDROCORTISONE (ANUSOL-HC) 25 MG SUPPOSITORY    Place 1 suppository (25 mg) rectally 2 times daily as needed for hemorrhoids.     WITCH HAZEL-GLYCERIN (TUCKS) PAD    Apply topically as needed for hemorrhoids.          =================================================================    HPI        Marianela Nickerson is a 39 year old female presents to the emergency department for evaluation of pain from hemorrhoid.  Her pain started this morning and she describes her pain as sharp, constant, 10 out of 10 in intensity and not relieved or exacerbated by any particular activity.  She tried her hemorrhoid cream ice which did not help her pain.  Has been constipated over the past few days, but today her bowel movements are more regular.  She denies any fevers, chills, nausea or vomiting.      REVIEW OF SYSTEMS     Constitutional:  Denies fever or chills  HENT:  Denies sore throat   Respiratory:  Denies cough or shortness of breath   Cardiovascular:  Denies chest pain or palpitations  GI:  hemorrhoid pain  Musculoskeletal:  Denies any new extremity pain   Skin:  Denies rash   Neurologic:  Denies headache, focal weakness or sensory changes    All other systems reviewed and are negative      PAST MEDICAL HISTORY:  htn    PAST SURGICAL HISTORY:  No past surgical history on file.        CURRENT MEDICATIONS:    hydrocortisone (ANUSOL-HC) 25 MG suppository  witch hazel-glycerin (TUCKS) pad        ALLERGIES:  Allergies   Allergen Reactions    Penicillins Hives, Unknown and Nausea and Vomiting    Ketamine Other (See Comments) and Unknown     Hallucinations      Nsaids Other (See Comments) and Unknown     Avoids due to surgery for achalasia...    gastrectomy       FAMILY HISTORY:  No family history on file.    SOCIAL HISTORY:   Social History     Socioeconomic History    Marital status: Single   Substance and Sexual Activity    Alcohol use: Yes    Drug use: Yes     Types: Marijuana   Social History Narrative    1/17/22: Lives at home with her , who is her caretaker. Patient has trouble walking due to chronic foot pain, has a service dog at home. Smokes  "marijuana to help with the pain.        VITALS:  Patient Vitals for the past 24 hrs:   BP Temp Temp src Pulse Resp SpO2 Height Weight   07/08/25 0433 (!) 182/112 97.2  F (36.2  C) Oral 108 20 97 % 1.651 m (5' 5\") 82.6 kg (182 lb)       PHYSICAL EXAM    Constitutional:  Well developed, Well nourished,  HENT:  Normocephalic, Atraumatic, Bilateral external ears normal, Oropharynx moist, Nose normal.   Neck:  Normal range of motion, No meningismus, No stridor.   Eyes:  EOMI, Conjunctiva normal, No discharge.   Respiratory:  Normal breath sounds, No respiratory distress, No wheezing, No chest tenderness.   Cardiovascular:  Normal heart rate, Normal rhythm, No murmurs  GI:  Soft, No tenderness, thrombosed external hemorrhoid at about 10 o'clock position  Musculoskeletal:  Neurovascularly intact distally, No edema, No tenderness, No cyanosis, Good range of motion in all major joints.  Integument:  Warm, Dry, No erythema, No rash.   Lymphatic:  No lymphadenopathy noted.   Neurologic:  Alert & oriented , Normal motor function,  No focal deficits noted.   Psychiatric:  Affect normal, Judgment normal, Mood normal.      PROCEDURE: Incision and thrombectomy   INDICATIONS: Thrombosed hemorrhoid   PROCEDURE PROVIDER: Dr Mitra Coe   SITE: perirectal   MEDICATION: 2 mLs of 1% Lidocaine with epinephrine   NOTE: The area was prepped with chlorhexidine and draped off in the usual sterile fashion.  Local anesthetic was injected subcutaneously with anesthesia effects demonstrated prior to proceeding.  The area of maximal fluctuance was opened with a # 11 Blade ([place]) using a Single Straight incision to allow for drainage.  The thrombosed hemorrhoid was drained.  No Packing was placed into the abscess cavity.  A sterile dressing was placed over the area.   COMPLEXITY: Simple    Simple = single, furuncle, paronychia, superficial  Complex = multiple or abscess requiring probing, loculations, packing placement "   COMPLICATIONS: Patient tolerated procedure well, without complication             Mitra Coe MD  Emergency Medicine  [facility] EMERGENCY ROOM  [address] [id number] [phone]  Dept: [phone]       Mitra Coe MD  07/08/25 0537

## 2025-07-08 NOTE — ED TRIAGE NOTES
Pt reports having a bowel movement around 0100 after 3 days of constipation. Hx hemorrhoids for the past 3 years that have been internal. Had another bowel movement around 0330 and now reports hemorrhoid is external. 10/10 pain to rectum.  Unable to sit, cough, lay down.      Triage Assessment (Adult)       Row Name 07/08/25 0434          Triage Assessment    Airway WDL WDL        Respiratory WDL    Respiratory WDL WDL        Skin Circulation/Temperature WDL    Skin Circulation/Temperature WDL WDL        Cardiac WDL    Cardiac WDL X;rhythm     Pulse Rate & Regularity tachycardic        Peripheral/Neurovascular WDL    Peripheral Neurovascular WDL WDL        Cognitive/Neuro/Behavioral WDL    Cognitive/Neuro/Behavioral WDL WDL

## 2025-07-08 NOTE — DISCHARGE INSTRUCTIONS
Sit in a warm bathtub 2-3 times daily which will help relieve your discomfort  Ice for 10 to 15 minutes every 1-2 hours may help relieve your discomfort  Continue your hemorrhoid suppositories as previously prescribed  Tylenol 650 mg every 4 hours as needed for pain

## 2025-07-08 NOTE — ED NOTES
AIDET performed, white board updated for rounding. Patient updated on plan of care. Patient's pain assessed. Call light within reach, bed in low position, side rails up. Visitor at bedside: significant other

## 2025-07-10 ENCOUNTER — TELEPHONE (OUTPATIENT)
Dept: SURGERY | Facility: CLINIC | Age: 40
End: 2025-07-10
Payer: COMMERCIAL

## 2025-07-10 NOTE — TELEPHONE ENCOUNTER
Received voicemail from patient requesting to schedule an appt.       Routed to Clinic coordinators.       Kraig Ernandez on 7/10/2025 at 8:36 AM

## 2025-07-15 ENCOUNTER — TELEPHONE (OUTPATIENT)
Dept: SURGERY | Facility: CLINIC | Age: 40
End: 2025-07-15
Payer: COMMERCIAL

## 2025-07-15 NOTE — TELEPHONE ENCOUNTER
Left Voicemail (2nd Attempt) for the patient to call back and schedule the following:    Appointment type: New Patient   Provider: Next Available CR MONA   Return date: 1-2 weeks   Specialty phone number: [phone]  Additional appointment(s) needed: n/a  Additonal Notes: Ok to schedule with any CRS MONA in 1-2 weeks for follow up clot excision of thrombosed hemorrhoid